# Patient Record
Sex: FEMALE | Race: BLACK OR AFRICAN AMERICAN | NOT HISPANIC OR LATINO | Employment: OTHER | URBAN - METROPOLITAN AREA
[De-identification: names, ages, dates, MRNs, and addresses within clinical notes are randomized per-mention and may not be internally consistent; named-entity substitution may affect disease eponyms.]

---

## 2023-10-04 ENCOUNTER — OFFICE VISIT (OUTPATIENT)
Dept: SURGERY | Facility: CLINIC | Age: 53
End: 2023-10-04
Payer: OTHER GOVERNMENT

## 2023-10-04 VITALS
HEART RATE: 87 BPM | SYSTOLIC BLOOD PRESSURE: 184 MMHG | HEIGHT: 65 IN | BODY MASS INDEX: 36.55 KG/M2 | WEIGHT: 219.4 LBS | DIASTOLIC BLOOD PRESSURE: 93 MMHG

## 2023-10-04 DIAGNOSIS — Z80.0 FAMILY HISTORY OF PANCREATIC CANCER: ICD-10-CM

## 2023-10-04 DIAGNOSIS — N60.09 CYST OF BREAST, UNSPECIFIED LATERALITY: Primary | ICD-10-CM

## 2023-10-04 PROCEDURE — 99203 OFFICE O/P NEW LOW 30 MIN: CPT | Performed by: SURGERY

## 2023-10-04 RX ORDER — BLOOD-GLUCOSE METER
KIT MISCELLANEOUS
COMMUNITY
Start: 2023-07-10

## 2023-10-04 RX ORDER — DULAGLUTIDE 3 MG/.5ML
INJECTION, SOLUTION SUBCUTANEOUS
COMMUNITY
Start: 2023-08-11

## 2023-10-04 RX ORDER — LANCETS 28 GAUGE
EACH MISCELLANEOUS
COMMUNITY
Start: 2023-07-10

## 2023-10-04 RX ORDER — DIETHYLPROPION HYDROCHLORIDE 75 MG/1
TABLET ORAL
COMMUNITY
Start: 2023-09-21

## 2023-10-04 RX ORDER — BUTALBITAL, ACETAMINOPHEN AND CAFFEINE 50; 325; 40 MG/1; MG/1; MG/1
TABLET ORAL
COMMUNITY
Start: 2023-09-15

## 2023-10-04 RX ORDER — ONDANSETRON 4 MG/1
TABLET, ORALLY DISINTEGRATING ORAL
COMMUNITY
Start: 2023-06-07

## 2023-10-04 RX ORDER — DULAGLUTIDE 0.75 MG/.5ML
INJECTION, SOLUTION SUBCUTANEOUS
COMMUNITY
Start: 2023-06-12

## 2023-10-04 RX ORDER — DULAGLUTIDE 4.5 MG/.5ML
INJECTION, SOLUTION SUBCUTANEOUS
COMMUNITY
Start: 2023-09-25

## 2023-10-04 RX ORDER — CHLORTHALIDONE 25 MG/1
TABLET ORAL
COMMUNITY
Start: 2023-06-20

## 2023-10-04 RX ORDER — DEXTROAMPHETAMINE SACCHARATE, AMPHETAMINE ASPARTATE, DEXTROAMPHETAMINE SULFATE AND AMPHETAMINE SULFATE 3.75; 3.75; 3.75; 3.75 MG/1; MG/1; MG/1; MG/1
15 TABLET ORAL DAILY
COMMUNITY

## 2023-10-04 RX ORDER — ALBUTEROL SULFATE 90 UG/1
AEROSOL, METERED RESPIRATORY (INHALATION)
COMMUNITY
Start: 2023-08-10

## 2023-10-04 RX ORDER — FLUCONAZOLE 150 MG/1
TABLET ORAL
COMMUNITY
Start: 2023-09-18

## 2023-10-04 RX ORDER — METRONIDAZOLE 500 MG/1
TABLET ORAL
COMMUNITY
Start: 2023-09-18

## 2023-10-04 RX ORDER — DULAGLUTIDE 1.5 MG/.5ML
INJECTION, SOLUTION SUBCUTANEOUS
COMMUNITY
Start: 2023-07-11

## 2023-10-04 NOTE — PROGRESS NOTES
Chief Complaint: Cyst (Left Breast) and Pain    Subjective         History of Present Illness  Brittany Marshall is a 53 y.o. female presents to [facility] to be seen for upper outer breast pain in the left breast with increased asymmetric density.  Her sister passed from pancreatic cancer at age 53. She had imaging at [place] linked below that showed a 1 cm cyst lower in the breast.  Otherwise she has no family history of cancer.    Objective     Past Medical History:   Diagnosis Date    Asthma Don't remember    Breast mass 2017    Hypertension        Past Surgical History:   Procedure Laterality Date    BREAST BIOPSY  2017         Current Outpatient Medications:     albuterol sulfate  (90 Base) MCG/ACT inhaler, , Disp: , Rfl:     amLODIPine (NORVASC) 5 MG tablet, Take 1 tablet by mouth Daily., Disp: , Rfl:     amphetamine-dextroamphetamine (Adderall) 15 MG tablet, Take 1 tablet by mouth Daily., Disp: , Rfl:     Blood Glucose Monitoring Suppl (FreeStyle Lite) w/Device kit, TEST FASTING BLOOD SUGAR ONCE DAILY, Disp: , Rfl:     butalbital-acetaminophen-caffeine (FIORICET, ESGIC) -40 MG per tablet, , Disp: , Rfl:     chlorthalidone (HYGROTON) 25 MG tablet, , Disp: , Rfl:     Diethylpropion HCl ER 75 MG tablet sustained-release 24 hour, TAKE 1 TABLET BY MOUTH IN THE MID MORNING, Disp: , Rfl:     fluconazole (DIFLUCAN) 150 MG tablet, , Disp: , Rfl:     FREESTYLE LITE test strip, USE TO TEST FASTING BLOOD SUGAR ONCE DAILY, Disp: , Rfl:     Lancets (freestyle) lancets, USE TO TEST FASTING BLOOD SUGAR ONCE DAILY, Disp: , Rfl:     lisinopril (PRINIVIL,ZESTRIL) 40 MG tablet, Take 1 tablet by mouth Daily., Disp: , Rfl:     metroNIDAZOLE (FLAGYL) 500 MG tablet, , Disp: , Rfl:     Trulicity 4.5 MG/0.5ML solution pen-injector, INJECT 4.5MG UNDER THE SKIN WEEKLY, Disp: , Rfl:     Diclofenac Sodium (VOLTAREN) 1 % gel gel, , Disp: , Rfl:     ondansetron ODT (ZOFRAN-ODT) 4 MG  "disintegrating tablet, DISSOLVE 1 TABLET ON THE TONGUE EVERY 4 TO 6 HOURS AS NEEDED FOR NAUSEA (Patient not taking: Reported on 10/4/2023), Disp: , Rfl:     Trulicity 0.75 MG/0.5ML solution pen-injector, ADMINISTER 0.75 MG UNDER THE SKIN WEEKLY AS DIRECTED (Patient not taking: Reported on 10/4/2023), Disp: , Rfl:     Trulicity 1.5 MG/0.5ML solution pen-injector, ADMINISTER 1.5 MG UNDER THE SKIN WEEKLY (Patient not taking: Reported on 10/4/2023), Disp: , Rfl:     Trulicity 3 MG/0.5ML solution pen-injector, INJECT 3 MG UNDER THE SKIN EVERY WEEK (Patient not taking: Reported on 10/4/2023), Disp: , Rfl:     No Known Allergies     Family History   Problem Relation Age of Onset    Asthma Brother        Social History     Socioeconomic History    Marital status: Single   Tobacco Use    Smoking status: Never    Smokeless tobacco: Never   Substance and Sexual Activity    Alcohol use: Never    Drug use: Never    Sexual activity: Not Currently     Partners: Male     Birth control/protection: None       Vital Signs:   BP (!) 184/93   Pulse 87   Ht 165.1 cm (65\")   Wt 99.5 kg (219 lb 6.4 oz)   BMI 36.51 kg/m²    Review of Systems    Physical Exam  Vitals and nursing note reviewed.   Constitutional:       Appearance: Normal appearance.   HENT:      Head: Normocephalic and atraumatic.   Eyes:      Extraocular Movements: Extraocular movements intact.      Pupils: Pupils are equal, round, and reactive to light.   Cardiovascular:      Pulses: Normal pulses.   Pulmonary:      Effort: Pulmonary effort is normal. No accessory muscle usage or respiratory distress.   Chest:   Breasts:     Right: Normal. No inverted nipple, mass, nipple discharge or skin change.      Left: Normal. Tenderness present. No inverted nipple, mass, nipple discharge or skin change.      Comments: Increased asymmetric density in upper outer quadrant of left breast  Abdominal:      General: Abdomen is flat.      Palpations: Abdomen is soft.      Tenderness: " There is no abdominal tenderness. There is no guarding.   Musculoskeletal:         General: No swelling, tenderness or deformity.      Cervical back: Neck supple.   Lymphadenopathy:      Upper Body:      Right upper body: No supraclavicular or axillary adenopathy.      Left upper body: No supraclavicular or axillary adenopathy.   Skin:     General: Skin is warm and dry.   Neurological:      General: No focal deficit present.      Mental Status: She is alert and oriented to person, place, and time.   Psychiatric:         Mood and Affect: Mood normal.         Thought Content: Thought content normal.        Result Review :               Assessment and Plan    Diagnoses and all orders for this visit:    1. Cyst of breast, unspecified laterality (Primary)  -     MRI Breast Bilateral Screening With & Without Contrast; Future  -     Ambulatory Referral to Genetic Counseling/Testing    2. Family history of pancreatic cancer  -     Ambulatory Referral to Genetic Counseling/Testing        Follow Up   Return for Followup after imaging study complete.  Patient was given instructions and counseling regarding her condition or for health maintenance advice. Please see specific information pulled into the AVS if appropriate.         This document has been electronically signed by Suzette Watts MD  October 4, 2023 12:15 EDT

## 2024-04-12 ENCOUNTER — TELEPHONE (OUTPATIENT)
Dept: GENETICS | Facility: HOSPITAL | Age: 54
End: 2024-04-12
Payer: OTHER GOVERNMENT

## 2024-04-12 NOTE — TELEPHONE ENCOUNTER
Called pt to remind her of her upcoming genetic counseling appt on Monday. Reviewed family history with patient.

## 2024-04-29 ENCOUNTER — TELEPHONE (OUTPATIENT)
Dept: SURGERY | Facility: CLINIC | Age: 54
End: 2024-04-29
Payer: OTHER GOVERNMENT

## 2024-05-09 ENCOUNTER — OFFICE VISIT (OUTPATIENT)
Dept: SURGERY | Facility: CLINIC | Age: 54
End: 2024-05-09
Payer: OTHER GOVERNMENT

## 2024-05-09 VITALS — WEIGHT: 216 LBS | BODY MASS INDEX: 35.99 KG/M2 | RESPIRATION RATE: 18 BRPM | HEIGHT: 65 IN

## 2024-05-09 DIAGNOSIS — N63.0 MASS OF BREAST, UNSPECIFIED LATERALITY: Primary | ICD-10-CM

## 2024-05-09 RX ORDER — TOPIRAMATE 25 MG/1
TABLET ORAL
COMMUNITY
Start: 2024-04-05

## 2024-05-10 ENCOUNTER — TELEPHONE (OUTPATIENT)
Dept: SURGERY | Facility: CLINIC | Age: 54
End: 2024-05-10
Payer: OTHER GOVERNMENT

## 2024-05-13 ENCOUNTER — TELEPHONE (OUTPATIENT)
Dept: SURGERY | Facility: CLINIC | Age: 54
End: 2024-05-13

## 2024-05-13 NOTE — TELEPHONE ENCOUNTER
[facility] [place] staff attempted to follow warm transfer process and was unsuccessful     Caller: UGO STEIN    Relationship to patient: SELF    Best call back number: [phone] (home)       Patient is needing: SCHEDULE ASPIRATION/BIOPSY BREAST.     [facility] FOLLOWED PREVIOUS ENCOUNTER TO CALL CENTRAL SCHEDULING; THEREAFTER [facility] WAS TOLD TO CONTACT CHERI KOCH @ [phone]- bh STAFF WORKS IN MAMMOGRAPHY DEPT & SCHEDULES THESE ORDERS. NO ANSWER.     PT ASKED [facility] TO LEAVE A MESSAGE OF ATTEMPT TO SCHEDULE BREAST ORDERS. - EXPLAINED TO PT THAT [facility] DOES NOT HAVE CLEARANCE TO SCHEDULE ANY BREAST APPTS/ORDERS.

## 2024-05-16 ENCOUNTER — HOSPITAL ENCOUNTER (OUTPATIENT)
Dept: OTHER | Facility: HOSPITAL | Age: 54
Discharge: HOME | End: 2024-05-16

## 2024-06-03 ENCOUNTER — PATIENT OUTREACH (OUTPATIENT)
Dept: ONCOLOGY | Facility: HOSPITAL | Age: 54
End: 2024-06-03
Payer: OTHER GOVERNMENT

## 2024-06-03 ENCOUNTER — HOSPITAL ENCOUNTER (OUTPATIENT)
Dept: MAMMOGRAPHY | Facility: HOSPITAL | Age: 54
Discharge: HOME | End: 2024-06-03
Payer: OTHER GOVERNMENT

## 2024-06-03 DIAGNOSIS — N63.0 MASS OF BREAST, UNSPECIFIED LATERALITY: ICD-10-CM

## 2024-06-03 PROCEDURE — A4648 IMPLANTABLE TISSUE MARKER: HCPCS

## 2024-06-03 PROCEDURE — 88305 TISSUE EXAM BY PATHOLOGIST: CPT | Performed by: RADIOLOGY

## 2024-06-03 PROCEDURE — 25010000002 LIDOCAINE 1 % SOLUTION

## 2024-06-03 RX ORDER — LIDOCAINE HYDROCHLORIDE AND EPINEPHRINE 10; 10 MG/ML; UG/ML
INJECTION, SOLUTION INFILTRATION; PERINEURAL
Status: COMPLETED
Start: 2024-06-03 | End: 2024-06-03

## 2024-06-03 RX ORDER — LIDOCAINE HYDROCHLORIDE 10 MG/ML
INJECTION, SOLUTION INFILTRATION; PERINEURAL
Status: COMPLETED
Start: 2024-06-03 | End: 2024-06-03

## 2024-06-03 RX ADMIN — LIDOCAINE HYDROCHLORIDE,EPINEPHRINE BITARTRATE: 10; .01 INJECTION, SOLUTION INFILTRATION; PERINEURAL at 14:39

## 2024-06-03 RX ADMIN — LIDOCAINE HYDROCHLORIDE: 10 INJECTION, SOLUTION INFILTRATION; PERINEURAL at 14:39

## 2024-06-05 LAB
CYTO UR: NORMAL
LAB AP CASE REPORT: NORMAL
LAB AP CLINICAL INFORMATION: NORMAL
PATH REPORT.FINAL DX SPEC: NORMAL
PATH REPORT.GROSS SPEC: NORMAL

## 2024-06-13 ENCOUNTER — OFFICE VISIT (OUTPATIENT)
Dept: SURGERY | Facility: CLINIC | Age: 54
End: 2024-06-13
Payer: OTHER GOVERNMENT

## 2024-06-13 VITALS — WEIGHT: 221.6 LBS | RESPIRATION RATE: 18 BRPM | HEIGHT: 65 IN | BODY MASS INDEX: 36.92 KG/M2

## 2024-06-13 DIAGNOSIS — N63.0 MASS OF BREAST, UNSPECIFIED LATERALITY: Primary | ICD-10-CM

## 2024-06-13 PROCEDURE — 99024 POSTOP FOLLOW-UP VISIT: CPT | Performed by: SURGERY

## 2024-06-13 NOTE — PROGRESS NOTES
"Chief Complaint  Follow-up    Subjective          History of Present Illness  The patient is here to follow up on breast biopsy.  They are doing well and have no complaints.  Pathology is shown below:    Results for orders placed or performed during the hospital encounter of 06/03/24   Tissue Pathology Exam    Specimen: Breast, Left; Tissue   Result Value Ref Range    Case Report       Surgical Pathology Report                         Case: [id number]                                  Authorizing Provider:  Luba Chirinos MD     Collected:           06/03/2024 02:35 PM          Ordering Location:     [facility]      Received:            06/04/2024 08:02 AM                                 MAMMOGRAPHY                                                                  Pathologist:           Vianney Nguyen MD                                                     Specimen:    Breast, Left, LT BREAST U/S BX/6:00, 3CMFN                                                 Clinical Information       Left breast mass      Final Diagnosis       Left breast,  6 o'clock position, 3 cm from nipple, ultrasound-guided core biopsy:   - Apocrine metaplasia  - Negative for atypia and malignancy        Gross Description       1. Breast, Left.  Received in formalin and labeled \" left breast ultrasound biopsy/6:00, 3 cm from nipple\" is a 1.5 cm fibrofatty breast core.  The specimen is entirely submitted in 1 cassette. Cold ischemic time-2 minutes; total formalin fixation time-30 hours and 23 minutes.     [facility]      Microscopic Description       Microscopic examination performed.          Objective   Vital Signs:   Resp 18   Ht 165.1 cm (65\")   Wt 101 kg (221 lb 9.6 oz)   BMI 36.88 kg/m²     Physical Exam  Vitals and nursing note reviewed.   Constitutional:       Appearance: Normal appearance.   HENT:      Head: Normocephalic and atraumatic.   Eyes:      Extraocular Movements: Extraocular movements intact.      Pupils: " Pupils are equal, round, and reactive to light.   Cardiovascular:      Pulses: Normal pulses.   Pulmonary:      Effort: Pulmonary effort is normal. No accessory muscle usage or respiratory distress.   Chest:   Breasts:     Right: Normal. No inverted nipple, mass, nipple discharge or skin change.      Left: Normal. No inverted nipple, mass, nipple discharge or skin change.      Comments: Well healed biopsy site  Abdominal:      General: Abdomen is flat.      Palpations: Abdomen is soft.      Tenderness: There is no abdominal tenderness. There is no guarding.   Musculoskeletal:         General: No swelling, tenderness or deformity.      Cervical back: Neck supple.   Lymphadenopathy:      Upper Body:      Right upper body: No supraclavicular or axillary adenopathy.      Left upper body: No supraclavicular or axillary adenopathy.   Skin:     General: Skin is warm and dry.   Neurological:      General: No focal deficit present.      Mental Status: She is alert and oriented to person, place, and time.   Psychiatric:         Mood and Affect: Mood normal.         Thought Content: Thought content normal.            Result Review :                 Assessment and Plan    Diagnoses and all orders for this visit:    1. Mass of breast, unspecified laterality (Primary)  -     US Breast Left Limited; Future    Will have her followup in 3 months with ultrasound to re-evaluate hematoma and area in left breast - she may want the area excised at that time    Follow Up   Return in about 3 months (around 9/13/2024).  Patient was given instructions and counseling regarding her condition or for health maintenance advice. Please see specific information pulled into the AVS if appropriate.

## 2024-07-18 ENCOUNTER — TELEPHONE (OUTPATIENT)
Dept: SURGERY | Facility: CLINIC | Age: 54
End: 2024-07-18
Payer: OTHER GOVERNMENT